# Patient Record
Sex: FEMALE | Race: WHITE
[De-identification: names, ages, dates, MRNs, and addresses within clinical notes are randomized per-mention and may not be internally consistent; named-entity substitution may affect disease eponyms.]

---

## 2021-02-25 ENCOUNTER — HOSPITAL ENCOUNTER (OUTPATIENT)
Dept: HOSPITAL 35 - SJCVCIMAG | Age: 71
End: 2021-02-25
Attending: INTERNAL MEDICINE
Payer: COMMERCIAL

## 2021-02-25 DIAGNOSIS — R06.02: ICD-10-CM

## 2021-02-25 DIAGNOSIS — R09.89: ICD-10-CM

## 2021-02-25 DIAGNOSIS — R94.39: ICD-10-CM

## 2021-02-25 DIAGNOSIS — I65.23: ICD-10-CM

## 2021-02-25 DIAGNOSIS — R94.31: Primary | ICD-10-CM

## 2021-02-25 DIAGNOSIS — Z87.891: ICD-10-CM

## 2021-02-25 DIAGNOSIS — R07.89: ICD-10-CM

## 2021-02-25 DIAGNOSIS — Z82.49: ICD-10-CM

## 2021-02-25 DIAGNOSIS — E78.00: ICD-10-CM

## 2021-02-25 DIAGNOSIS — I10: ICD-10-CM

## 2021-02-26 ENCOUNTER — HOSPITAL ENCOUNTER (OUTPATIENT)
Dept: HOSPITAL 35 - ER | Age: 71
Setting detail: OBSERVATION
Discharge: HOME | End: 2021-02-26
Attending: INTERNAL MEDICINE | Admitting: INTERNAL MEDICINE
Payer: COMMERCIAL

## 2021-02-26 VITALS — HEIGHT: 70 IN | WEIGHT: 189.99 LBS | BODY MASS INDEX: 27.2 KG/M2

## 2021-02-26 VITALS — DIASTOLIC BLOOD PRESSURE: 71 MMHG | SYSTOLIC BLOOD PRESSURE: 151 MMHG

## 2021-02-26 VITALS — DIASTOLIC BLOOD PRESSURE: 95 MMHG | SYSTOLIC BLOOD PRESSURE: 198 MMHG

## 2021-02-26 VITALS — SYSTOLIC BLOOD PRESSURE: 167 MMHG | DIASTOLIC BLOOD PRESSURE: 78 MMHG

## 2021-02-26 DIAGNOSIS — I10: ICD-10-CM

## 2021-02-26 DIAGNOSIS — Z82.49: ICD-10-CM

## 2021-02-26 DIAGNOSIS — E78.00: ICD-10-CM

## 2021-02-26 DIAGNOSIS — Z20.822: ICD-10-CM

## 2021-02-26 DIAGNOSIS — R07.89: Primary | ICD-10-CM

## 2021-02-26 DIAGNOSIS — Z79.899: ICD-10-CM

## 2021-02-26 DIAGNOSIS — R06.00: ICD-10-CM

## 2021-02-26 LAB
ALBUMIN SERPL-MCNC: 3.8 G/DL (ref 3.4–5)
ALT SERPL-CCNC: 25 U/L (ref 30–65)
ANION GAP SERPL CALC-SCNC: 7 MMOL/L (ref 7–16)
AST SERPL-CCNC: 25 U/L (ref 15–37)
BASOPHILS NFR BLD AUTO: 0.7 % (ref 0–2)
BILIRUB SERPL-MCNC: 0.6 MG/DL (ref 0.2–1)
BUN SERPL-MCNC: 12 MG/DL (ref 7–18)
CALCIUM SERPL-MCNC: 9.4 MG/DL (ref 8.5–10.1)
CHLORIDE SERPL-SCNC: 102 MMOL/L (ref 98–107)
CO2 SERPL-SCNC: 28 MMOL/L (ref 21–32)
CREAT SERPL-MCNC: 0.9 MG/DL (ref 0.6–1)
EOSINOPHIL NFR BLD: 2.3 % (ref 0–3)
ERYTHROCYTE [DISTWIDTH] IN BLOOD BY AUTOMATED COUNT: 13.7 % (ref 10.5–14.5)
GLUCOSE SERPL-MCNC: 117 MG/DL (ref 74–106)
GRANULOCYTES NFR BLD MANUAL: 64.9 % (ref 36–66)
HCT VFR BLD CALC: 42.3 % (ref 37–47)
HGB BLD-MCNC: 14.5 GM/DL (ref 12–15)
LYMPHOCYTES NFR BLD AUTO: 24.6 % (ref 24–44)
MCH RBC QN AUTO: 30.2 PG (ref 26–34)
MCHC RBC AUTO-ENTMCNC: 34.3 G/DL (ref 28–37)
MCV RBC: 88 FL (ref 80–100)
MONOCYTES NFR BLD: 7.5 % (ref 1–8)
NEUTROPHILS # BLD: 4.7 THOU/UL (ref 1.4–8.2)
PLATELET # BLD: 229 THOU/UL (ref 150–400)
POTASSIUM SERPL-SCNC: 3.8 MMOL/L (ref 3.5–5.1)
PROT SERPL-MCNC: 7.5 G/DL (ref 6.4–8.2)
RBC # BLD AUTO: 4.8 MIL/UL (ref 4.2–5)
SODIUM SERPL-SCNC: 137 MMOL/L (ref 136–145)
TROPONIN I SERPL-MCNC: <0.06 NG/ML (ref ?–0.06)
WBC # BLD AUTO: 7.2 THOU/UL (ref 4–11)

## 2021-02-26 NOTE — EKG
[address]  [address]
Phone:  [phone]                    ELECTROCARDIOGRAM REPORT      
_______________________________________________________________________________
 
Name:       JIM ROSENBERG           Room #:         170-4       ADM IN  
M.R.#:      [id number]     Account #:      [id number]  
Admission:  21    Attend Phys:    Clive Hurtado MD,
Discharge:              Date of Birth:  [birth year]  
                                                          Report #: [id number]
   [id number]
_______________________________________________________________________________
                         [facility] ED
                                       
Test Date:    2021               Test Time:    09:28:58
Pat Name:     JIM ROSENBERG        Department:   
Patient ID:   [id number]            Room:         170
Gender:       F                        Technician:   JCMONI
:          1950               Requested By: Ramiro Johnson
Order Number: [id number] MD:   Jordan Mishra
                                 Measurements
Intervals                              Axis          
Rate:         64                       P:            51
OR:           210                      QRS:          -1
QRSD:         98                       T:            21
QT:           460                                    
QTc:          475                                    
                           Interpretive Statements
Sinus rhythm
Borderline T abnormalities, anterior leads
No previous ECG available for comparison
Electronically Signed On 2021 12:59:57 CST by Jordan Mishra
[email or web address]
 
 
 
 
 
 
 
 
 
 
 
 
 
 
 
 
 
 
 
 
 
 
  <ELECTRONICALLY SIGNED>
   By: Jordan Mishra MD        
  21     1259
D: 21 0928                           _____________________________________
T: 21                           Jordan Mishra MD          /JENNIFER

## 2021-02-26 NOTE — CATHLAB
[facility]
[address] [place]   [address]                   INVASIVE PROCEDURE REPORT     
_______________________________________________________________________________
 
Name:       JIM ROSENBERG           Room #:         170-4       ADM IN  
M.R.#:      [id number]                       Account #:      [id number]  
Admission:  02/26/21    Attend Phys:    Clive Hurtado MD,
Discharge:              Date of Birth:  [birth year]  
                                                          Report #: [id number]
                                                                    [id number]
_______________________________________________________________________________
THIS REPORT FOR:  
 
cc:  Alvarez Slater Jeffrey W. DO Mancuso, Gerald M. MD [facility]                                        
                                                                       ~
 
--------------- APPROVED REPORT --------------
 
 
Study performed:  02/26/2021 12:43:10
 
Patient Details
Patient Status: ED                  Room #: 
The patient is a 70 year-old female
 
Event Personnel
Clive Hurtado  Interventional Cardiologist, Nunu Epperson RTR 
[employer] RTR Monitor, Haroldo Chandler RN RN
 
Procedures Performed
Left Heart Cath w/or w/o Coronaries [id number] [facility] Art Access - R 
femoral artery*  Aortogram Abdominal Peripheral Angio [id number] 
Initial Mod Sed Same Phys/QHP Gr5y [id number] Hemostasis w/ 
Mynx
 
Indication
Chest pain
 
Procedure Narrative
The Right Groin^ was infiltrated with 1% Lidocaine subcutaneous 
anesthesia. A PINNACLE 6FR Sheath #[id number] sheath was inserted into 
the RFA^. Coronary angiography was performed using coronary 
diagnostic catheters. The right coronary system was accessed and 
visualized with a JR4 catheter. The left coronary system was accessed 
and visualized with a JL4 catheter. The left ventricle was accessed 
and visualized with a PIGTAIL catheter. Left ventriculogram was 
performed in 30 degree projection. Closure device was deployed with a 
Fr MYNXGRIP 6/7F #[id number]. The patient tolerated the procedure well 
and there were no complications associated with the procedure. There 
was no hematoma.
 
Intraoperative Conscious Sedation
Sedation start time:  13:35           Case end Time:  
14:02    
Fentanyl  50 mcg    Versed  1 mg  
 
 
[facility]
[place]  [address]
Phone:  [phone]                    INVASIVE PROCEDURE REPORT     
_______________________________________________________________________________
 
Name:            JIM ROSENBERG           Room #:        170-4       [facility] [place]
M.R.#:           [id number]          Account #:     [id number]  
Admission:       02/26/21         Attend Phys:   Clive Hurtado,
Discharge:                  Date of Birth: [birth year]  
                         Report #:      [id number]
        [id number]
_______________________________________________________________________________
 
Fluoro Time:    1.30 minutes    
Dose:     DAP 3784.40 cGycm2  477 mGy  
Contrast Type and Amount:  Omnipaque 105 ml   
 
Hemodynamics
The aortic pressure is 195/82 mmHg with a mean of 96 mmHg. The left 
ventricular pressure is 193/6 mmHg with a mean of mmHg. The left 
ventricular end diastolic pressure is 35 mmHg. 
 
Conclusion
#1.  Normal left ventricular size and subtle apical hypokinesis EF 
55%.
#2 abdominal aortogram is mildly tortuous but no significant 
aneurysm.  Single bilateral renal arteries appear widely patent.
#3 left main free of disease giving rise to LAD and circumflex.
#4 LAD with mild irregularities 3040% proximal mid lesion with 
diffuse distal disease the vessel is somewhat small and attenuated at 
the apex.
#5 circumflex OM nondominant with mild disease.
#6 dominant right coronary artery with mild irregularity no occlusive 
disease.
 
Recommendations and plan: Continue aggressive risk factor 
modification.  There is no indication for coronary intervention.  
Nuclear stress testing in [facility] revealed apical defect I do 
not see clear evidence of a prior infarct.  There is subtle apical 
hypokinesis.
 
 
 
 
 
 
 
 
 
 
 
 
 
 
 
 
  <ELECTRONICALLY SIGNED>
   By: Clive Hurtado MD, FACC   
  02/26/21     1513
D: 02/26/21 1513                           _____________________________________
T: 02/26/21 1513                           Clive Hurtado MD, FACC     /INF